# Patient Record
Sex: MALE | Race: WHITE | URBAN - METROPOLITAN AREA
[De-identification: names, ages, dates, MRNs, and addresses within clinical notes are randomized per-mention and may not be internally consistent; named-entity substitution may affect disease eponyms.]

---

## 2017-10-30 ENCOUNTER — TELEPHONE (OUTPATIENT)
Dept: FAMILY MEDICINE | Facility: OTHER | Age: 47
End: 2017-10-30

## 2017-10-30 NOTE — TELEPHONE ENCOUNTER
11:41 AM    Reason for Call: Phone Call    Description: Pt spouse calling who is a pt of [facility]  Requesting something for pt for flight not our pt but his primary out of town. She hung up phone before I could get all information thinking his clinic calling back.    Was an appointment offered for this call? No  If yes : Appointment type              Date    Preferred method for responding to this message: Telephone Call  What is your phone number ?[phone]    If we cannot reach you directly, may we leave a detailed response at the number you provided? Yes    Can this message wait until your PCP/provider returns, if available today?     Lise Mays

## 2020-08-03 ENCOUNTER — RESULTS ONLY (OUTPATIENT)
Dept: LAB | Age: 50
End: 2020-08-03

## 2020-08-03 PROCEDURE — C9803 HOPD COVID-19 SPEC COLLECT: HCPCS

## 2020-08-04 LAB
SARS-COV-2 RNA SPEC QL NAA+PROBE: NOT DETECTED
SPECIMEN SOURCE: NORMAL